# Patient Record
(demographics unavailable — no encounter records)

---

## 2024-10-17 NOTE — REVIEW OF SYSTEMS
[Fever] : no fever [Chills] : no chills [Leg Claudication] : no intermittent leg claudication [Lower Ext Edema] : no lower extremity edema [Joint Swelling] : no joint swelling [Joint Stiffness] : no joint stiffness [Limb Pain] : no limb pain [Limb Swelling] : no limb swelling [As Noted in HPI] : as noted in HPI

## 2024-10-17 NOTE — END OF VISIT
[FreeTextEntry3] : All medical record entries made by the heidiibe were at my, NEGRO HUSSEIN, direction and personally dictated by me on 10/17/2024. I have reviewed the chart and agree that the record accurately reflects my personal performance of the history, physical exam, assessment, and plan. I have also personally directed, reviewed, and agreed with the chart.

## 2024-10-17 NOTE — ASSESSMENT
[FreeTextEntry1] : 84 yo female presents for an vascular assessment. Patient was seen by the ED at [facility] after suffering a fall and syncope event. She underwent multiple tests, one of which was a carotid duplex.  I reviewed the carotid duplex results that demonstrated mild to moderate carotid stenosis bilaterally. I discussed the son in law that she does not meet the current indications to treat a carotid stenosis. Given her asymptomatic status as well as excellent stability on imaging, we will continue nonoperative management at this time.  She will follow-up with me in 6 months for repeat duplex and surveillance. She is well aware to seek emergency care should he have any signs or symptoms of stroke.  In regards to her numbness, her symptom is not from a vascular standpoint. I advise the patient to follow up with neurology for further evaluation.

## 2024-10-17 NOTE — PHYSICAL EXAM
[JVD] : no jugular venous distention  [Normal Breath Sounds] : Normal breath sounds [Ankle Swelling (On Exam)] : not present [Varicose Veins Of Lower Extremities] : not present [] : not present [Alert] : alert [Oriented to Person] : oriented to person [Oriented to Place] : oriented to place [Oriented to Time] : oriented to time [de-identified] : NAD [de-identified] : NCAT [de-identified] : Soft, NT, ND. No abdominal masses. [de-identified] :  No gross motor or sensory deficits. [de-identified] : Appropriate

## 2024-10-17 NOTE — HISTORY OF PRESENT ILLNESS
[FreeTextEntry1] : 86 yo female presents for an vascular assessment. Patient was seen by the ED at [facility] after suffering a fall and syncope event. She reports a bulge to the left side of the neck. She underwent multiple tests, one of which was a carotid duplex. Carotid duplex demonstrated mild to moderate carotid stenosis bilaterally. Patient was instructed to follow up with vascular. Patient reports numbness of the lower extremities, right>left. Patient has ongoing worsening dementia. Patient is accompanied by son in law.

## 2024-11-19 NOTE — PHYSICAL EXAM
[Normal] : Well developed, in no acute distress, alert and oriented to person, place and time [Normal muscle bulk without asymmetry] : normal muscle bulk without asymmetry [Facet Tenderness] : no facet tenderness [Spurling] : negative Spurling's Test [Fontanez] : positive Fontanez Test [Neer's] : positive Neer's Test

## 2024-11-19 NOTE — REASON FOR VISIT
[Initial Consultation] : an initial pain management consultation [FreeTextEntry2] : Ref by Dr. Rogers

## 2024-11-19 NOTE — HISTORY OF PRESENT ILLNESS
[Joint Pain] : joint  [___ mths] : [unfilled] month(s) ago [Constant] : constant [6] : an average pain level of 6/10 [4] : a minimum pain level of 4/10 [10] : a maximum pain level of 10/10 [Aching] : aching [Burning] : burning [Shooting] : shooting [Standing] : standing [Walking] : walking [Medications] : medications [FreeTextEntry1] : Interval Note:  She returns with complaints of left sided back and buttock pain radiating to lateral knee.  Pain is so bad that patient finds it difficult to perform adls and ambulate.  Also complains of right shoulder pain with limitation in ROM. . Denies any additional weakness, numbness, bowel/bladder dysfunction.    HPI     Ms. DANIEL ELLER is a 85 year F with pmhx of aortic aneurysm (Dr Watson), RA, Raynaud's, C/o severe lower back and left posterolateral leg pain. Numbness to bilateral feet. Pain so severe she has been unable to ambulate due to pain. Reliant on a wheelchair at this time. No relief with OTC medications, Gabapentin, and opiates. Denies any additional weakness, numbness, bowel/bladder dysfunction, history of bleeding disorders.   Previous and current pain medications/doses/effects: Motrin, Lidocaine patch, hydrocodone, Gabapentin      Previous Pain Treatments: None     Previous Pain Injections:   LEFT L4-5, L5-S1  transforaminal epidural steroid injection 8/16/24 LEFT L4-5, L5-S1  transforaminal epidural steroid injection 5/7/24 L5-S1 interlaminar epidural steroid injection 3/5/24     Previous Diagnostic Studies/Images:   MRI LS 3/2024  LOCALIZER: No additional findings. BONES: Edematous endplate changes are seen at L3-L4 and L5-S1. Fatty endplate changes are seen at L2-L3 and L4-L5. Mixed fatty and edematous end plate changes are seen at T12-L1. ALIGNMENT: There is levoscoliosis of the lumbar spine. There is trace right lateral listhesis of T11 on T12 and T12 on L1. There is mild left lateral listhesis of L2 on L3 and L3 on L4. There is exaggeration of the lumbar lordosis. There is trace retrolisthesis of T11 on T12, L1 on L2, L2 on L3, and L3 on L4. SACROILIAC JOINTS/SACRUM: There is no sacral fracture. The SI joints are partially visualized but are intact. CONUS AND CAUDA EQUINA: The distal cord and conus are normal in signal. Conus terminates at L1. VISUALIZED INTRAPELVIC/INTRA-ABDOMINAL SOFT TISSUES: Multiple hepatic cysts are partially imaged, the largest measuring up to 1.5 cm within the right hepatic lobe. PARASPINAL SOFT TISSUES: There is moderate fatty infiltration of the posterior paraspinal musculature.   INDIVIDUAL LEVELS:  There is multilevel severe disc space narrowing and vacuum phenomenon.  LOWER THORACIC SPINE: T10-T11: There is bilateral facet arthrosis. There is mild diffuse disc bulge. There is moderate bilateral neural foraminal narrowing. There is flattening of ventral thecal sac without contacting of the cord. T11-T12: There is mild diffuse disc bulge posterior osseous ridging. There is bilateral facet arthrosis. Findings result in flattening of the ventral thecal sac with near contacting of the ventral cord. There is moderate bilateral neural foraminal narrowing. T12-L1: There is mild diffuse disc bulge with bilateral facet arthrosis. There is mild flattening of the ventral thecal sac. There is mild bilateral neural foraminal narrowing.  L1-L2: Diffuse disc bulge posterior osseous ridging which is asymmetric to the right. Bilateral facet arthrosis. Moderate right and mild left neural foraminal narrowing. Mild effacement of the right lateral recess. L2-L3: Diffuse disc bulge posterior osseous ridging. Bilateral facet arthrosis. Mild bilateral neural foraminal narrowing. Mild flattening of the ventral thecal sac. L3-L4: Diffuse disc bulge posterior osseous ridging. Superimposed left lateral recess/foraminal disc protrusion. Posterior osseous ridging. Bilateral facet arthrosis. Moderate to severe left and moderate right neural foraminal narrowing. Effacement of the left lateral recess with contacting of the descending left L4 nerve root. L4-L5: There is a diffuse disc bulge posterior osseous ridging. There is bilateral facet arthrosis. There is severe left and mild to moderate right neural foraminal narrowing. There is mild flattening of the ventral thecal sac. L5-S1: There is a diffuse disc bulge with a superimposed right paracentral disc protrusion. There is posterior osseous ridging. There is bilateral facet arthrosis. There is severe bilateral neural foraminal narrowing. There is effacement of the right lateral recess with contacting the descending right S1 nerve root.    IMPRESSION:  Multilevel lumbar spondylosis. Levoscoliosis of the lumbar spine. Trace right lateral listhesis of T11 on T12 and T12 on L1. Mild left lateral listhesis of L2 on L3 and L3 on L4. Trace retrolisthesis of T11 on T12, L1 on L2, L2 on L3, and L3 on L4.  T10-T11: There is moderate bilateral neural foraminal narrowing.  T11-T12: There is flattening of the ventral thecal sac with near contacting of the ventral cord. There is moderate bilateral neural foraminal narrowing.  T12-L1: There is mild bilateral neural foraminal narrowing.  L1-L2: Moderate right and mild left neural foraminal narrowing. Mild effacement of the right lateral recess.  L2-L3: Mild bilateral neural foraminal narrowing. Mild flattening of the ventral thecal sac.  L3-L4: Left lateral recess/foraminal disc protrusion. Moderate to severe left and moderate right neural foraminal narrowing. Effacement of the left lateral recess with contacting of the descending left L4 nerve root.  L4-L5: There is severe left and mild to moderate right neural foraminal narrowing. There is mild flattening of the ventral thecal sac.  L5-S1: There is severe bilateral neural foraminal narrowing. There is effacement of the right lateral recess with contacting the descending right S1 nerve root.   [FreeTextEntry7] : left hip

## 2024-11-19 NOTE — ASSESSMENT
[FreeTextEntry1] : >> Imaging and Other Studies   I personally reviewed the relevant imaging.  Discussed and explained to patient the likely source of pathology and pain.  Questions answered. MRI   >> Therapy and Other Modalities   start PT for rotator cuff arthropathy  >> Medications  caution risk of fall  acetaminophen 650mg q8h prn pain (caution <3g daily)   >> Interventions  Significant component of back and leg pain likely secondary to lumbar spinal stenosis demonstrated on MRI LS.  sp L5-S1 interlaminar epidural steroid injection with significant improvement in pain   Lumbar radiculopathy secondary to disc herniation and facet arthropathy demonstrated on MRI LS, refractory to conservative treatments, sp LEFT L4-5, L5-S1  transforaminal epidural steroid injection with significant improvement  given efficacy of previous intervention that is >80% improvement in pain and improved ability to perform adls, and return of pain despite conservative treatment, will schedule repeat  LEFT L4-5, L5-S1  transforaminal epidural steroid injection r/b/a discussed   (with particulate steroids given short efficacy of previous intervention with non particulates - as recommended by VESNA guidelines) - discussed r/b/a including rare incidence of paralysis associated with particulate steroid use in transforaminal injections - patient understands and agrees to proceed.  >> Consults   continue care with Dr. Rogers  >> Discussion of Risks/Benefits/Alternatives    >Regarding any scheduled procedures:   In the event the patient is scheduled for a procedure,   I have discussed in detail with the patient that any interventional pain procedure is associated with potential risks.  The procedure may include an injection of steroids and potentially other medications (local anesthetic and normal saline) into the epidural space or surrounding tissue of the spine.  There are significant risks of this procedure which include and are not limited to infection, bleeding, worsening pain, dural puncture leading to postdural puncture headache, nerve damage, spinal cord injury, paralysis, stroke, and death.   There is a chance that the procedure does not improve their pain.   There are risks associated with the steroid being absorbed into the body systemically.  These include dysphoria, difficulty sleeping, mood swings and personality changes.  Premenopausal women may notice an irregularity in her menstrual cycle for 2-3 months following the injection.  Steroids can specifically affect patients with hypertension, diabetes, and peptic ulcers.  The procedure may cause a temporary increase in blood pressure and blood pressure, and may adversely affect a peptic ulcer.  Other, more rare complications, include avascular necrosis of joints, glaucoma and worsening of osteoporosis.   I have discussed the risks of the procedure at length with the patient, and the potential benefits of pain relief.  I have offered alternatives to the procedure.  All questions were answered.   The patient expressed understanding and wishes to proceed with the procedure.   > Longitudinal management of Complex Painful condition   The patient is being managed for a complex condition that requires ongoing management.  The nature of this condition demands nuanced approach to treatment.  The seriousness of the condition necessitates an in-depth and focused approach to management and coordination with other healthcare professionals.    This visit involves intricate evaluation and management of the patient's condition.  The complexity of the visit was due to the need for detailed assessment of the current state, consideration of potential complications and a careful balancing of treatment options to management the chronic condition effectively.   As detailed above, the patient has a chronic significant painful condition that requires regular and detailed management.  The condition's impact on the patient's quality of life and health is substantial and necessitates a comprehensive and tailored approach   >> Conclusion   There were no barriers to communication. Informed patient that I would be available for any additional questions. Patient was instructed to call with any worsening symptoms including severe pain, new numbness/weakness, or changes in the bowel/bladder function. Discussed role of nsaids in pain management and all relevant risks, if patient is continuing to require after 4 weeks the patient should f/u for alternative treatment. Instructed patient to maintain pain diary to monitor pain level, mobility, and function.

## 2024-12-20 NOTE — PROCEDURE
[FreeTextEntry1] : TRANSFORAMINAL EPIDURAL STEROID INJECTION LEFT  The patient was placed in the prone position on the fluoroscopic table with a pillow under the abdomen.  Routine monitors were applied.  The back was draped in the usual sterile fashion and sterile technique was adhered to throughout the entire procedure.  The [L4-5] was identified under fluoroscopic guidance.  The vertebral body end plates were aligned and the foramen was brought into view using an oblique angulation of the C-arm.  The skin and subcutaneous tissues were infiltrated with 1% Lidocaine.  After adequate local anesthesia a 3.5 25g spinal needle was inserted at the guidance to the superolateral portion of the foramen.  Needle position was confirmed in the AP and lateral views.  After negative aspiration for heme and CSF, 1cc Omnipaque N180 contrast dye was injected.  Epidural spread of contrast was confirmed in the AP and lateral views.  The patient was injected with a total of 1cc lidocaine 1% and 40mg kenalog   The [L5-S1] was identified under fluoroscopic guidance.  The vertebral body end plates were aligned and the foramen was brought into view using an oblique angulation of the C-arm.  The skin and subcutaneous tissues were infiltrated with 1% Lidocaine.  After adequate local anesthesia a 3.5 25g spinal needle was inserted at the guidance to the superolateral portion of the foramen.  Needle position was confirmed in the AP and lateral views.  After negative aspiration for heme and CSF, 1cc Omnipaque N180 contrast dye was injected.  Epidural spread of contrast was confirmed in the AP and lateral views.  The patient was injected with a total of 1cc lidocaine 1% and 40mg kenalog   The patient tolerated the procedure well.  There was no neurological deficit post procedure.  The patient went to recovery room in stable condition.  Discharge instructions were given to the patient.

## 2025-01-03 NOTE — ASSESSMENT
[FreeTextEntry1] : >> Imaging and Other Studies   I personally reviewed the relevant imaging.  Discussed and explained to patient the likely source of pathology and pain.  Questions answered. MRI   >> Therapy and Other Modalities   start PT - referral provided  >> Medications  caution risk of fall  acetaminophen 650mg q8h prn pain (caution <3g daily)   >> Interventions  Significant component of back and leg pain likely secondary to lumbar spinal stenosis demonstrated on MRI LS.  sp L5-S1 interlaminar epidural steroid injection with significant improvement in pain   Lumbar radiculopathy secondary to disc herniation and facet arthropathy demonstrated on MRI LS, refractory to conservative treatments, sp LEFT L4-5, L5-S1  transforaminal epidural steroid injection with significant improvement X 2    >> Consults   continue care with Dr. Rogers  >> Discussion of Risks/Benefits/Alternatives    >Regarding any scheduled procedures:   In the event the patient is scheduled for a procedure,   I have discussed in detail with the patient that any interventional pain procedure is associated with potential risks.  The procedure may include an injection of steroids and potentially other medications (local anesthetic and normal saline) into the epidural space or surrounding tissue of the spine.  There are significant risks of this procedure which include and are not limited to infection, bleeding, worsening pain, dural puncture leading to postdural puncture headache, nerve damage, spinal cord injury, paralysis, stroke, and death.   There is a chance that the procedure does not improve their pain.   There are risks associated with the steroid being absorbed into the body systemically.  These include dysphoria, difficulty sleeping, mood swings and personality changes.  Premenopausal women may notice an irregularity in her menstrual cycle for 2-3 months following the injection.  Steroids can specifically affect patients with hypertension, diabetes, and peptic ulcers.  The procedure may cause a temporary increase in blood pressure and blood pressure, and may adversely affect a peptic ulcer.  Other, more rare complications, include avascular necrosis of joints, glaucoma and worsening of osteoporosis.   I have discussed the risks of the procedure at length with the patient, and the potential benefits of pain relief.  I have offered alternatives to the procedure.  All questions were answered.   The patient expressed understanding and wishes to proceed with the procedure.   > Longitudinal management of Complex Painful condition   The patient is being managed for a complex condition that requires ongoing management.  The nature of this condition demands nuanced approach to treatment.  The seriousness of the condition necessitates an in-depth and focused approach to management and coordination with other healthcare professionals.    This visit involves intricate evaluation and management of the patient's condition.  The complexity of the visit was due to the need for detailed assessment of the current state, consideration of potential complications and a careful balancing of treatment options to management the chronic condition effectively.   As detailed above, the patient has a chronic significant painful condition that requires regular and detailed management.  The condition's impact on the patient's quality of life and health is substantial and necessitates a comprehensive and tailored approach   >> Conclusion   There were no barriers to communication. Informed patient that I would be available for any additional questions. Patient was instructed to call with any worsening symptoms including severe pain, new numbness/weakness, or changes in the bowel/bladder function. Discussed role of nsaids in pain management and all relevant risks, if patient is continuing to require after 4 weeks the patient should f/u for alternative treatment. Instructed patient to maintain pain diary to monitor pain level, mobility, and function.

## 2025-01-03 NOTE — PHYSICAL EXAM
[Normal] : Well developed, in no acute distress, alert and oriented to person, place and time [Normal muscle bulk without asymmetry] : normal muscle bulk without asymmetry [Facet Tenderness] : no facet tenderness [Walker] : ambulates with walker [] : Motor:

## 2025-01-03 NOTE — HISTORY OF PRESENT ILLNESS
[Joint Pain] : joint  [___ mths] : [unfilled] month(s) ago [Constant] : constant [6] : an average pain level of 6/10 [4] : a minimum pain level of 4/10 [10] : a maximum pain level of 10/10 [Aching] : aching [Burning] : burning [Shooting] : shooting [Standing] : standing [Walking] : walking [Medications] : medications [FreeTextEntry1] : Interval Note:  sp LEFT L4-5, L5-S1  transforaminal epidural steroid injection 12/20/24 with 90% improvement in back and leg pain.  Patient doing well. denies any worsening numbness, weakness, bowel/bladder dysfunction.  HPI     Ms. DANIEL ELLER is a 85 year F with pmhx of aortic aneurysm (Dr Watson), RA, Raynaud's, C/o severe lower back and left posterolateral leg pain. Numbness to bilateral feet. Pain so severe she has been unable to ambulate due to pain. Reliant on a wheelchair at this time. No relief with OTC medications, Gabapentin, and opiates. Denies any additional weakness, numbness, bowel/bladder dysfunction, history of bleeding disorders.   Previous and current pain medications/doses/effects: Motrin, Lidocaine patch, hydrocodone, Gabapentin      Previous Pain Treatments: None     Previous Pain Injections:   LEFT L4-5, L5-S1  transforaminal epidural steroid injection 12/20/24 LEFT L4-5, L5-S1  transforaminal epidural steroid injection 8/16/24 LEFT L4-5, L5-S1  transforaminal epidural steroid injection 5/7/24 L5-S1 interlaminar epidural steroid injection 3/5/24     Previous Diagnostic Studies/Images:   MRI LS 3/2024  LOCALIZER: No additional findings. BONES: Edematous endplate changes are seen at L3-L4 and L5-S1. Fatty endplate changes are seen at L2-L3 and L4-L5. Mixed fatty and edematous end plate changes are seen at T12-L1. ALIGNMENT: There is levoscoliosis of the lumbar spine. There is trace right lateral listhesis of T11 on T12 and T12 on L1. There is mild left lateral listhesis of L2 on L3 and L3 on L4. There is exaggeration of the lumbar lordosis. There is trace retrolisthesis of T11 on T12, L1 on L2, L2 on L3, and L3 on L4. SACROILIAC JOINTS/SACRUM: There is no sacral fracture. The SI joints are partially visualized but are intact. CONUS AND CAUDA EQUINA: The distal cord and conus are normal in signal. Conus terminates at L1. VISUALIZED INTRAPELVIC/INTRA-ABDOMINAL SOFT TISSUES: Multiple hepatic cysts are partially imaged, the largest measuring up to 1.5 cm within the right hepatic lobe. PARASPINAL SOFT TISSUES: There is moderate fatty infiltration of the posterior paraspinal musculature.   INDIVIDUAL LEVELS:  There is multilevel severe disc space narrowing and vacuum phenomenon.  LOWER THORACIC SPINE: T10-T11: There is bilateral facet arthrosis. There is mild diffuse disc bulge. There is moderate bilateral neural foraminal narrowing. There is flattening of ventral thecal sac without contacting of the cord. T11-T12: There is mild diffuse disc bulge posterior osseous ridging. There is bilateral facet arthrosis. Findings result in flattening of the ventral thecal sac with near contacting of the ventral cord. There is moderate bilateral neural foraminal narrowing. T12-L1: There is mild diffuse disc bulge with bilateral facet arthrosis. There is mild flattening of the ventral thecal sac. There is mild bilateral neural foraminal narrowing.  L1-L2: Diffuse disc bulge posterior osseous ridging which is asymmetric to the right. Bilateral facet arthrosis. Moderate right and mild left neural foraminal narrowing. Mild effacement of the right lateral recess. L2-L3: Diffuse disc bulge posterior osseous ridging. Bilateral facet arthrosis. Mild bilateral neural foraminal narrowing. Mild flattening of the ventral thecal sac. L3-L4: Diffuse disc bulge posterior osseous ridging. Superimposed left lateral recess/foraminal disc protrusion. Posterior osseous ridging. Bilateral facet arthrosis. Moderate to severe left and moderate right neural foraminal narrowing. Effacement of the left lateral recess with contacting of the descending left L4 nerve root. L4-L5: There is a diffuse disc bulge posterior osseous ridging. There is bilateral facet arthrosis. There is severe left and mild to moderate right neural foraminal narrowing. There is mild flattening of the ventral thecal sac. L5-S1: There is a diffuse disc bulge with a superimposed right paracentral disc protrusion. There is posterior osseous ridging. There is bilateral facet arthrosis. There is severe bilateral neural foraminal narrowing. There is effacement of the right lateral recess with contacting the descending right S1 nerve root.    IMPRESSION:  Multilevel lumbar spondylosis. Levoscoliosis of the lumbar spine. Trace right lateral listhesis of T11 on T12 and T12 on L1. Mild left lateral listhesis of L2 on L3 and L3 on L4. Trace retrolisthesis of T11 on T12, L1 on L2, L2 on L3, and L3 on L4.  T10-T11: There is moderate bilateral neural foraminal narrowing.  T11-T12: There is flattening of the ventral thecal sac with near contacting of the ventral cord. There is moderate bilateral neural foraminal narrowing.  T12-L1: There is mild bilateral neural foraminal narrowing.  L1-L2: Moderate right and mild left neural foraminal narrowing. Mild effacement of the right lateral recess.  L2-L3: Mild bilateral neural foraminal narrowing. Mild flattening of the ventral thecal sac.  L3-L4: Left lateral recess/foraminal disc protrusion. Moderate to severe left and moderate right neural foraminal narrowing. Effacement of the left lateral recess with contacting of the descending left L4 nerve root.  L4-L5: There is severe left and mild to moderate right neural foraminal narrowing. There is mild flattening of the ventral thecal sac.  L5-S1: There is severe bilateral neural foraminal narrowing. There is effacement of the right lateral recess with contacting the descending right S1 nerve root.   [FreeTextEntry7] : left hip

## 2025-04-29 NOTE — DATA REVIEWED
[FreeTextEntry1] : Carotid duplex performed at the [facility] No significant right-sided stenosis Moderate left-sided carotid stenosis STABLE

## 2025-04-29 NOTE — ASSESSMENT
[FreeTextEntry1] : 86 yo female presents for an vascular assessment. Patient was seen by the ED at [facility] after suffering a fall and syncope event. She underwent multiple tests, one of which was a carotid duplex.  I reviewed the carotid duplex results that demonstrated mild to moderate carotid stenosis bilaterally. I discussed the son in law that she does not meet the current indications to treat a carotid stenosis. Given her asymptomatic status as well as excellent stability on imaging, we will continue nonoperative management at this time.   On 6-month follow-up, she remains asymptomatic from a carotid standpoint. Her carotid duplex reveals moderate left-sided stenosis, no significant right-sided stenosis, stable findings. As such, we will continue best medical management which she is already on. Her family is well aware of signs and symptoms of stroke, will seek emergency care if she has any symptoms.

## 2025-04-29 NOTE — REASON FOR VISIT
[Family Member] : family member [Home] : at home, [unfilled] , at the time of the visit. [Medical Office: ([facility])___] : at the medical office located in  [This encounter was initiated by telehealth (audio with video) and converted to telephone (audio only)] : This encounter was initiated by telehealth (audio with video) and converted to telephone (audio only) [Technical] : patient unable to effectively utilize tele-video due to technical issues. [Verbal consent obtained from patient] : the patient, [unfilled]

## 2025-04-29 NOTE — HISTORY OF PRESENT ILLNESS
[FreeTextEntry1] : 86 yo female presents for an vascular assessment. Patient was seen by the ED at [facility] after suffering a fall and syncope event. She reports a bulge to the left side of the neck. She underwent multiple tests, one of which was a carotid duplex. Carotid duplex demonstrated mild to moderate carotid stenosis bilaterally. Patient was instructed to follow up with vascular. Patient reports numbness of the lower extremities, right>left. Patient has ongoing worsening dementia. Patient is accompanied by son in law.      [de-identified] : 4/21/25 - This televisit was performed with the patient's family member.  She is doing well.  She denies symptoms suggestive of neurological ischemia.  She denies vision or speech problems, upper or lower extremity weakness or numbness. She has not had recurrent falls. She underwent carotid duplex.

## 2025-05-09 NOTE — ASSESSMENT
[FreeTextEntry1] : >> Imaging and Other Studies   I personally reviewed the relevant imaging.  Discussed and explained to patient the likely source of pathology and pain.  Questions answered. MRI   >> Therapy and Other Modalities   PT  >> Medications  caution risk of fall  acetaminophen 650mg q8h prn pain (caution <3g daily)   >> Interventions  Significant component of back and leg pain likely secondary to lumbar spinal stenosis demonstrated on MRI LS.  sp L5-S1 interlaminar epidural steroid injection with significant improvement in pain   Lumbar radiculopathy secondary to disc herniation and facet arthropathy demonstrated on MRI LS, refractory to conservative treatments, sp LEFT L4-5, L5-S1  transforaminal epidural steroid injection with significant improvement X 2  given efficacy of previous intervention that is >80% improvement in pain and improved ability to perform adls, and return of pain despite conservative treatment, will schedule repeat  LEFT L4-5, L5-S1  transforaminal epidural steroid injection r/b/a discussed   (with particulate steroids given short efficacy of previous intervention with non particulates - as recommended by VESNA guidelines) - discussed r/b/a including rare incidence of paralysis associated with particulate steroid use in transforaminal injections - patient understands and agrees to proceed.  >> Consults   continue care with Dr. Rogers  >> Discussion of Risks/Benefits/Alternatives    >Regarding any scheduled procedures:   In the event the patient is scheduled for a procedure,   I have discussed in detail with the patient that any interventional pain procedure is associated with potential risks.  The procedure may include an injection of steroids and potentially other medications (local anesthetic and normal saline) into the epidural space or surrounding tissue of the spine.  There are significant risks of this procedure which include and are not limited to infection, bleeding, worsening pain, dural puncture leading to postdural puncture headache, nerve damage, spinal cord injury, paralysis, stroke, and death.   There is a chance that the procedure does not improve their pain.   There are risks associated with the steroid being absorbed into the body systemically.  These include dysphoria, difficulty sleeping, mood swings and personality changes.  Premenopausal women may notice an irregularity in her menstrual cycle for 2-3 months following the injection.  Steroids can specifically affect patients with hypertension, diabetes, and peptic ulcers.  The procedure may cause a temporary increase in blood pressure and blood pressure, and may adversely affect a peptic ulcer.  Other, more rare complications, include avascular necrosis of joints, glaucoma and worsening of osteoporosis.   I have discussed the risks of the procedure at length with the patient, and the potential benefits of pain relief.  I have offered alternatives to the procedure.  All questions were answered.   The patient expressed understanding and wishes to proceed with the procedure.   > Longitudinal management of Complex Painful condition   The patient is being managed for a complex condition that requires ongoing management.  The nature of this condition demands nuanced approach to treatment.  The seriousness of the condition necessitates an in-depth and focused approach to management and coordination with other healthcare professionals.    This visit involves intricate evaluation and management of the patient's condition.  The complexity of the visit was due to the need for detailed assessment of the current state, consideration of potential complications and a careful balancing of treatment options to management the chronic condition effectively.   As detailed above, the patient has a chronic significant painful condition that requires regular and detailed management.  The condition's impact on the patient's quality of life and health is substantial and necessitates a comprehensive and tailored approach   >> Conclusion   There were no barriers to communication. Informed patient that I would be available for any additional questions. Patient was instructed to call with any worsening symptoms including severe pain, new numbness/weakness, or changes in the bowel/bladder function. Discussed role of nsaids in pain management and all relevant risks, if patient is continuing to require after 4 weeks the patient should f/u for alternative treatment. Instructed patient to maintain pain diary to monitor pain level, mobility, and function.

## 2025-05-09 NOTE — HISTORY OF PRESENT ILLNESS
[Joint Pain] : joint  [___ mths] : [unfilled] month(s) ago [Constant] : constant [6] : an average pain level of 6/10 [4] : a minimum pain level of 4/10 [10] : a maximum pain level of 10/10 [Aching] : aching [Burning] : burning [Shooting] : shooting [Standing] : standing [Walking] : walking [Medications] : medications [FreeTextEntry1] : Interval Note:  Repots that she is having pain in left lower back and lateral thigh, leg.  Pain is so bad that patient finds it difficult to perform adls and ambulate. No longer on gabapentin because of fall risk.  Patient doing well. denies any worsening numbness, weakness, bowel/bladder dysfunction.  HPI     Ms. DANIEL ELLER is a 85 year F with pmhx of aortic aneurysm (Dr Watson), RA, Raynaud's, C/o severe lower back and left posterolateral leg pain. Numbness to bilateral feet. Pain so severe she has been unable to ambulate due to pain. Reliant on a wheelchair at this time. No relief with OTC medications, Gabapentin, and opiates. Denies any additional weakness, numbness, bowel/bladder dysfunction, history of bleeding disorders.   Previous and current pain medications/doses/effects: Motrin, Lidocaine patch, hydrocodone, Gabapentin      Previous Pain Treatments: None     Previous Pain Injections:   LEFT L4-5, L5-S1  transforaminal epidural steroid injection 12/20/24 LEFT L4-5, L5-S1  transforaminal epidural steroid injection 8/16/24 LEFT L4-5, L5-S1  transforaminal epidural steroid injection 5/7/24 L5-S1 interlaminar epidural steroid injection 3/5/24     Previous Diagnostic Studies/Images:   MRI LS 3/2024  LOCALIZER: No additional findings. BONES: Edematous endplate changes are seen at L3-L4 and L5-S1. Fatty endplate changes are seen at L2-L3 and L4-L5. Mixed fatty and edematous end plate changes are seen at T12-L1. ALIGNMENT: There is levoscoliosis of the lumbar spine. There is trace right lateral listhesis of T11 on T12 and T12 on L1. There is mild left lateral listhesis of L2 on L3 and L3 on L4. There is exaggeration of the lumbar lordosis. There is trace retrolisthesis of T11 on T12, L1 on L2, L2 on L3, and L3 on L4. SACROILIAC JOINTS/SACRUM: There is no sacral fracture. The SI joints are partially visualized but are intact. CONUS AND CAUDA EQUINA: The distal cord and conus are normal in signal. Conus terminates at L1. VISUALIZED INTRAPELVIC/INTRA-ABDOMINAL SOFT TISSUES: Multiple hepatic cysts are partially imaged, the largest measuring up to 1.5 cm within the right hepatic lobe. PARASPINAL SOFT TISSUES: There is moderate fatty infiltration of the posterior paraspinal musculature.   INDIVIDUAL LEVELS:  There is multilevel severe disc space narrowing and vacuum phenomenon.  LOWER THORACIC SPINE: T10-T11: There is bilateral facet arthrosis. There is mild diffuse disc bulge. There is moderate bilateral neural foraminal narrowing. There is flattening of ventral thecal sac without contacting of the cord. T11-T12: There is mild diffuse disc bulge posterior osseous ridging. There is bilateral facet arthrosis. Findings result in flattening of the ventral thecal sac with near contacting of the ventral cord. There is moderate bilateral neural foraminal narrowing. T12-L1: There is mild diffuse disc bulge with bilateral facet arthrosis. There is mild flattening of the ventral thecal sac. There is mild bilateral neural foraminal narrowing.  L1-L2: Diffuse disc bulge posterior osseous ridging which is asymmetric to the right. Bilateral facet arthrosis. Moderate right and mild left neural foraminal narrowing. Mild effacement of the right lateral recess. L2-L3: Diffuse disc bulge posterior osseous ridging. Bilateral facet arthrosis. Mild bilateral neural foraminal narrowing. Mild flattening of the ventral thecal sac. L3-L4: Diffuse disc bulge posterior osseous ridging. Superimposed left lateral recess/foraminal disc protrusion. Posterior osseous ridging. Bilateral facet arthrosis. Moderate to severe left and moderate right neural foraminal narrowing. Effacement of the left lateral recess with contacting of the descending left L4 nerve root. L4-L5: There is a diffuse disc bulge posterior osseous ridging. There is bilateral facet arthrosis. There is severe left and mild to moderate right neural foraminal narrowing. There is mild flattening of the ventral thecal sac. L5-S1: There is a diffuse disc bulge with a superimposed right paracentral disc protrusion. There is posterior osseous ridging. There is bilateral facet arthrosis. There is severe bilateral neural foraminal narrowing. There is effacement of the right lateral recess with contacting the descending right S1 nerve root.    IMPRESSION:  Multilevel lumbar spondylosis. Levoscoliosis of the lumbar spine. Trace right lateral listhesis of T11 on T12 and T12 on L1. Mild left lateral listhesis of L2 on L3 and L3 on L4. Trace retrolisthesis of T11 on T12, L1 on L2, L2 on L3, and L3 on L4.  T10-T11: There is moderate bilateral neural foraminal narrowing.  T11-T12: There is flattening of the ventral thecal sac with near contacting of the ventral cord. There is moderate bilateral neural foraminal narrowing.  T12-L1: There is mild bilateral neural foraminal narrowing.  L1-L2: Moderate right and mild left neural foraminal narrowing. Mild effacement of the right lateral recess.  L2-L3: Mild bilateral neural foraminal narrowing. Mild flattening of the ventral thecal sac.  L3-L4: Left lateral recess/foraminal disc protrusion. Moderate to severe left and moderate right neural foraminal narrowing. Effacement of the left lateral recess with contacting of the descending left L4 nerve root.  L4-L5: There is severe left and mild to moderate right neural foraminal narrowing. There is mild flattening of the ventral thecal sac.  L5-S1: There is severe bilateral neural foraminal narrowing. There is effacement of the right lateral recess with contacting the descending right S1 nerve root.   [FreeTextEntry7] : left hip

## 2025-05-27 NOTE — PROCEDURE
[FreeTextEntry1] : TRANSFORAMINAL EPIDURAL STEROID INJECTION LEFT   The patient was placed in the prone position on the fluoroscopic table with a pillow under the abdomen.  Routine monitors were applied.  The back was draped in the usual sterile fashion and sterile technique was adhered to throughout the entire procedure.   The [L4-5] was identified under fluoroscopic guidance.  The vertebral body end plates were aligned and the foramen was brought into view using an oblique angulation of the C-arm.  The skin and subcutaneous tissues were infiltrated with 1% Lidocaine.  After adequate local anesthesia a 3.5 25g spinal needle was inserted at the guidance to the superolateral portion of the foramen.  Needle position was confirmed in the AP and lateral views.  After negative aspiration for heme and CSF, 1cc Omnipaque N180 contrast dye was injected.  Epidural spread of contrast was confirmed in the AP and lateral views.  The patient was injected with a total of 1cc lidocaine 1% and 8mg decadrone     The [L5-S1] was identified under fluoroscopic guidance.  The vertebral body end plates were aligned and the foramen was brought into view using an oblique angulation of the C-arm.  The skin and subcutaneous tissues were infiltrated with 1% Lidocaine.  After adequate local anesthesia a 3.5 25g spinal needle was inserted at the guidance to the superolateral portion of the foramen.  Needle position was confirmed in the AP and lateral views.  After negative aspiration for heme and CSF, 1cc Omnipaque N180 contrast dye was injected.  Epidural spread of contrast was confirmed in the AP and lateral views.  The patient was injected with a total of 1cc lidocaine 1% and 8mg decadrone   The patient tolerated the procedure well.  There was no neurological deficit post procedure.  The patient went to recovery room in stable condition.  Discharge instructions were given to the patient.  All unused medications were wasted